# Patient Record
Sex: FEMALE | Race: BLACK OR AFRICAN AMERICAN | NOT HISPANIC OR LATINO
[De-identification: names, ages, dates, MRNs, and addresses within clinical notes are randomized per-mention and may not be internally consistent; named-entity substitution may affect disease eponyms.]

---

## 2018-04-06 ENCOUNTER — APPOINTMENT (OUTPATIENT)
Dept: OBGYN | Facility: CLINIC | Age: 58
End: 2018-04-06
Payer: COMMERCIAL

## 2018-04-06 VITALS
SYSTOLIC BLOOD PRESSURE: 156 MMHG | HEIGHT: 62 IN | DIASTOLIC BLOOD PRESSURE: 100 MMHG | HEART RATE: 91 BPM | WEIGHT: 138 LBS | BODY MASS INDEX: 25.4 KG/M2

## 2018-04-06 DIAGNOSIS — Z78.9 OTHER SPECIFIED HEALTH STATUS: ICD-10-CM

## 2018-04-06 DIAGNOSIS — I10 ESSENTIAL (PRIMARY) HYPERTENSION: ICD-10-CM

## 2018-04-06 PROCEDURE — 99396 PREV VISIT EST AGE 40-64: CPT

## 2018-04-08 LAB
C TRACH RRNA SPEC QL NAA+PROBE: NOT DETECTED
HPV HIGH+LOW RISK DNA PNL CVX: NOT DETECTED
N GONORRHOEA RRNA SPEC QL NAA+PROBE: NOT DETECTED
SOURCE AMPLIFICATION: NORMAL

## 2018-04-11 LAB — CYTOLOGY CVX/VAG DOC THIN PREP: NORMAL

## 2019-04-20 ENCOUNTER — EMERGENCY (EMERGENCY)
Facility: HOSPITAL | Age: 59
LOS: 1 days | Discharge: HOME | End: 2019-04-20
Attending: EMERGENCY MEDICINE
Payer: COMMERCIAL

## 2019-04-20 VITALS
HEART RATE: 97 BPM | WEIGHT: 134.92 LBS | RESPIRATION RATE: 18 BRPM | HEIGHT: 62 IN | DIASTOLIC BLOOD PRESSURE: 84 MMHG | TEMPERATURE: 98 F | OXYGEN SATURATION: 98 % | SYSTOLIC BLOOD PRESSURE: 189 MMHG

## 2019-04-20 VITALS — HEART RATE: 98 BPM | OXYGEN SATURATION: 97 % | RESPIRATION RATE: 18 BRPM

## 2019-04-20 PROCEDURE — 71046 X-RAY EXAM CHEST 2 VIEWS: CPT | Mod: 26

## 2019-04-20 PROCEDURE — 71046 X-RAY EXAM CHEST 2 VIEWS: CPT

## 2019-04-20 PROCEDURE — 94640 AIRWAY INHALATION TREATMENT: CPT

## 2019-04-20 PROCEDURE — 99283 EMERGENCY DEPT VISIT LOW MDM: CPT | Mod: 25

## 2019-04-20 PROCEDURE — 99284 EMERGENCY DEPT VISIT MOD MDM: CPT

## 2019-04-20 RX ORDER — ALBUTEROL 90 UG/1
2.5 AEROSOL, METERED ORAL ONCE
Qty: 0 | Refills: 0 | Status: COMPLETED | OUTPATIENT
Start: 2019-04-20 | End: 2019-04-20

## 2019-04-20 RX ORDER — PSEUDOEPHEDRINE HCL 30 MG
30 TABLET ORAL ONCE
Qty: 0 | Refills: 0 | Status: COMPLETED | OUTPATIENT
Start: 2019-04-20 | End: 2019-04-20

## 2019-04-20 RX ORDER — PHENYLEPHRINE HYDROCHLORIDE 10 MG/ML
1 INJECTION INTRAVENOUS
Qty: 28 | Refills: 0
Start: 2019-04-20 | End: 2019-05-03

## 2019-04-20 RX ORDER — AZELASTINE 137 UG/1
2 SPRAY, METERED NASAL
Qty: 1 | Refills: 1
Start: 2019-04-20 | End: 2019-05-17

## 2019-04-20 RX ORDER — FLUTICASONE PROPIONATE 50 MCG
2 SPRAY, SUSPENSION NASAL ONCE
Qty: 0 | Refills: 0 | Status: COMPLETED | OUTPATIENT
Start: 2019-04-20 | End: 2019-04-20

## 2019-04-20 RX ADMIN — Medication 30 MILLIGRAM(S): at 17:24

## 2019-04-20 RX ADMIN — Medication 2 SPRAY(S): at 17:24

## 2019-04-20 RX ADMIN — ALBUTEROL 2.5 MILLIGRAM(S): 90 AEROSOL, METERED ORAL at 17:25

## 2019-04-20 NOTE — ED STATDOCS - ATTENDING CONTRIBUTION TO CARE
I, Alexander Murphy, performed the initial face to face bedside interview with this patient regarding history of present illness, review of symptoms and relevant past medical, social and family history.  I completed an independent physical examination.  I was the initial provider who evaluated this patient. I have signed out the follow up of any pending tests (i.e. labs, radiological studies) to the ACP.  I have communicated the patient’s plan of care and disposition with the ACP.

## 2019-04-20 NOTE — ED STATDOCS - CHPI ED SYMPTOM POS
dyspnea, ear pain, throat pain/NASAL CONGESTION/COUGH dyspnea on exertion, ear pain, throat pain/COUGH/NASAL CONGESTION

## 2019-04-20 NOTE — ED ADULT TRIAGE NOTE - CHIEF COMPLAINT QUOTE
Patient arrived ambulatory to ED, awake alert, and oriented times 3, breathing unlabored.  Patient states cough, throat pain since March 29th.  Patient states decreased taste.  patient also states SOB on exertion since March 29th.  Patient finished prednisone yesterday.  Patient non compliant with HTN meds

## 2019-04-20 NOTE — ED ADULT NURSE NOTE - NSIMPLEMENTINTERV_GEN_ALL_ED
Implemented All Universal Safety Interventions:  [place] to call system. Call bell, personal items and telephone within reach. Instruct patient to call for assistance. Room bathroom lighting operational. Non-slip footwear when patient is off stretcher. Physically safe environment: no spills, clutter or unnecessary equipment. Stretcher in lowest position, wheels locked, appropriate side rails in place.

## 2019-04-20 NOTE — ED STATDOCS - OBJECTIVE STATEMENT
58 y/o F pt with HTN (non-compliant with medications) presents to ED c/o worsening cough that began 3 weeks ago. Notes blood in mucous and blood after blowing her nose. Associated sx of dyspnea, throat pain, nasal congestion, ear pain and decreased sense of taste. 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 Notes 45 minutes after exposure, she felt throat pain. She has also been taking Zyrtec and nasal spray, with minimal relief. Within the last 3 weeks, she also went to urgent care and her PMD, and was prescribed prednisone on both occasions, with minimal relief. Pt finished prednisone yesterday. Non smoker, no EtOH. Denies fever, chills, headache, chest pain, back pain, generalized body aches. No further acute complaints at this time. 60 y/o F pt with HTN (non-compliant with medications) presents to ED c/o worsening cough that began 3 weeks ago. Associated sx of dyspnea on exertion, throat pain, nasal congestion, ear pain and decreased sense of taste. When she coughs, there is a "burning sensation in her throat and neck." Notes there is blood in mucous after coughing, and blood after blowing her nose. Pt reports at work on March 29th, she was exposed to "something through the vents during construction." Notes 45 minutes after exposure, she felt throat pain. She has also been taking Zyrtec and nasal spray, with minimal relief. Within the last 3 weeks, she also went to urgent care and her PMD, and was prescribed prednisone on both occasions, with minimal relief. Pt finished prednisone yesterday. Non smoker, no EtOH. Denies fever, chills, headache, chest pain, back pain, generalized body aches. No further acute complaints at this time.

## 2019-04-20 NOTE — ED STATDOCS - PROGRESS NOTE DETAILS
ISAURA Munoz NOTE: Pt evaluated at bedside. Pt evaluated prior by intake physician. Otherwise HPI/PE/ROS as noted above. Will follow up plan per intake physician. ISAURA Munoz NOTE: Pt evaluated at bedside. Denies visual changes, CP, palpitations. Pt evaluated prior by intake physician. Otherwise HPI/PE/ROS as noted above. Will follow up plan per intake physician. ISAURA Muonz NOTE: Patient stable for discharge, reports improvement in symptoms, vital signs stable, tolerating PO, ambulatory in ED. D/w Dr. Murphy, will send Rx and have pt f/u with ENT or allergy.  Advised pt to follow up with PMD for BP and to take BP meds as prescribed. Discussion with pt includes but is not limited to: results, plan, proper medication use and side effects, and return precautions. Patient will follow up with their PMD in 1-2 days and any specialists discussed. Advised patient to seek immediate medical attention for any new/worsening symptoms or concerns.  Patient provided with ample opportunity to ask questions which were answered to the fullest extent. Patient verbalized understanding and agreement of plan.

## 2019-04-20 NOTE — ED ADULT NURSE NOTE - PRO INTERPRETER NEED 2
Patient placed on continuous cardiac monitor, automatic blood pressure cuff and continuous pulse oximeter.   English

## 2019-04-30 PROBLEM — I10 ESSENTIAL (PRIMARY) HYPERTENSION: Chronic | Status: ACTIVE | Noted: 2019-04-20

## 2019-06-03 ENCOUNTER — APPOINTMENT (OUTPATIENT)
Dept: OBGYN | Facility: CLINIC | Age: 59
End: 2019-06-03

## 2019-06-11 ENCOUNTER — EMERGENCY (EMERGENCY)
Facility: HOSPITAL | Age: 59
LOS: 1 days | Discharge: HOME | End: 2019-06-11
Attending: EMERGENCY MEDICINE
Payer: COMMERCIAL

## 2019-06-11 VITALS — HEART RATE: 93 BPM | OXYGEN SATURATION: 98 % | SYSTOLIC BLOOD PRESSURE: 152 MMHG | DIASTOLIC BLOOD PRESSURE: 104 MMHG

## 2019-06-11 VITALS
HEIGHT: 62 IN | HEART RATE: 110 BPM | WEIGHT: 134.92 LBS | TEMPERATURE: 98 F | RESPIRATION RATE: 18 BRPM | OXYGEN SATURATION: 100 % | DIASTOLIC BLOOD PRESSURE: 115 MMHG | SYSTOLIC BLOOD PRESSURE: 182 MMHG

## 2019-06-11 DIAGNOSIS — R06.00 DYSPNEA, UNSPECIFIED: ICD-10-CM

## 2019-06-11 DIAGNOSIS — Z98.890 OTHER SPECIFIED POSTPROCEDURAL STATES: Chronic | ICD-10-CM

## 2019-06-11 DIAGNOSIS — R07.89 OTHER CHEST PAIN: ICD-10-CM

## 2019-06-11 DIAGNOSIS — Z90.49 ACQUIRED ABSENCE OF OTHER SPECIFIED PARTS OF DIGESTIVE TRACT: Chronic | ICD-10-CM

## 2019-06-11 DIAGNOSIS — Z90.711 ACQUIRED ABSENCE OF UTERUS WITH REMAINING CERVICAL STUMP: Chronic | ICD-10-CM

## 2019-06-11 DIAGNOSIS — Z98.51 TUBAL LIGATION STATUS: Chronic | ICD-10-CM

## 2019-06-11 DIAGNOSIS — Z98.891 HISTORY OF UTERINE SCAR FROM PREVIOUS SURGERY: Chronic | ICD-10-CM

## 2019-06-11 LAB
ALBUMIN SERPL ELPH-MCNC: 4.3 G/DL — SIGNIFICANT CHANGE UP (ref 3.3–5.2)
ALP SERPL-CCNC: 77 U/L — SIGNIFICANT CHANGE UP (ref 40–120)
ALT FLD-CCNC: 54 U/L — HIGH
ANION GAP SERPL CALC-SCNC: 16 MMOL/L — SIGNIFICANT CHANGE UP (ref 5–17)
AST SERPL-CCNC: 46 U/L — HIGH
BILIRUB SERPL-MCNC: 0.3 MG/DL — LOW (ref 0.4–2)
BUN SERPL-MCNC: 15 MG/DL — SIGNIFICANT CHANGE UP (ref 8–20)
CALCIUM SERPL-MCNC: 10.4 MG/DL — HIGH (ref 8.6–10.2)
CHLORIDE SERPL-SCNC: 103 MMOL/L — SIGNIFICANT CHANGE UP (ref 98–107)
CO2 SERPL-SCNC: 23 MMOL/L — SIGNIFICANT CHANGE UP (ref 22–29)
CREAT SERPL-MCNC: 0.8 MG/DL — SIGNIFICANT CHANGE UP (ref 0.5–1.3)
D DIMER BLD IA.RAPID-MCNC: <150 NG/ML DDU — SIGNIFICANT CHANGE UP
GLUCOSE SERPL-MCNC: 103 MG/DL — SIGNIFICANT CHANGE UP (ref 70–115)
HCT VFR BLD CALC: 40.6 % — SIGNIFICANT CHANGE UP (ref 37–47)
HGB BLD-MCNC: 14.1 G/DL — SIGNIFICANT CHANGE UP (ref 12–16)
MCHC RBC-ENTMCNC: 29 PG — SIGNIFICANT CHANGE UP (ref 27–31)
MCHC RBC-ENTMCNC: 34.7 G/DL — SIGNIFICANT CHANGE UP (ref 32–36)
MCV RBC AUTO: 83.5 FL — SIGNIFICANT CHANGE UP (ref 81–99)
NT-PROBNP SERPL-SCNC: 23 PG/ML — SIGNIFICANT CHANGE UP (ref 0–300)
PLATELET # BLD AUTO: 257 K/UL — SIGNIFICANT CHANGE UP (ref 150–400)
POTASSIUM SERPL-MCNC: 3.9 MMOL/L — SIGNIFICANT CHANGE UP (ref 3.5–5.3)
POTASSIUM SERPL-SCNC: 3.9 MMOL/L — SIGNIFICANT CHANGE UP (ref 3.5–5.3)
PROT SERPL-MCNC: 7.8 G/DL — SIGNIFICANT CHANGE UP (ref 6.6–8.7)
RBC # BLD: 4.86 M/UL — SIGNIFICANT CHANGE UP (ref 4.4–5.2)
RBC # FLD: 13.3 % — SIGNIFICANT CHANGE UP (ref 11–15.6)
SODIUM SERPL-SCNC: 142 MMOL/L — SIGNIFICANT CHANGE UP (ref 135–145)
TROPONIN T SERPL-MCNC: <0.01 NG/ML — SIGNIFICANT CHANGE UP (ref 0–0.06)
TROPONIN T SERPL-MCNC: <0.01 NG/ML — SIGNIFICANT CHANGE UP (ref 0–0.06)
WBC # BLD: 8.8 K/UL — SIGNIFICANT CHANGE UP (ref 4.8–10.8)
WBC # FLD AUTO: 8.8 K/UL — SIGNIFICANT CHANGE UP (ref 4.8–10.8)

## 2019-06-11 PROCEDURE — 93017 CV STRESS TEST TRACING ONLY: CPT

## 2019-06-11 PROCEDURE — 80053 COMPREHEN METABOLIC PANEL: CPT

## 2019-06-11 PROCEDURE — 93306 TTE W/DOPPLER COMPLETE: CPT | Mod: 26

## 2019-06-11 PROCEDURE — 93016 CV STRESS TEST SUPVJ ONLY: CPT

## 2019-06-11 PROCEDURE — 99220: CPT

## 2019-06-11 PROCEDURE — 85027 COMPLETE CBC AUTOMATED: CPT

## 2019-06-11 PROCEDURE — A9500: CPT

## 2019-06-11 PROCEDURE — 71045 X-RAY EXAM CHEST 1 VIEW: CPT

## 2019-06-11 PROCEDURE — 78452 HT MUSCLE IMAGE SPECT MULT: CPT

## 2019-06-11 PROCEDURE — 36415 COLL VENOUS BLD VENIPUNCTURE: CPT

## 2019-06-11 PROCEDURE — 78452 HT MUSCLE IMAGE SPECT MULT: CPT | Mod: 26

## 2019-06-11 PROCEDURE — 93306 TTE W/DOPPLER COMPLETE: CPT

## 2019-06-11 PROCEDURE — G0378: CPT

## 2019-06-11 PROCEDURE — 93005 ELECTROCARDIOGRAM TRACING: CPT

## 2019-06-11 PROCEDURE — 99222 1ST HOSP IP/OBS MODERATE 55: CPT | Mod: 25

## 2019-06-11 PROCEDURE — 83880 ASSAY OF NATRIURETIC PEPTIDE: CPT

## 2019-06-11 PROCEDURE — 85379 FIBRIN DEGRADATION QUANT: CPT

## 2019-06-11 PROCEDURE — 93010 ELECTROCARDIOGRAM REPORT: CPT

## 2019-06-11 PROCEDURE — 84484 ASSAY OF TROPONIN QUANT: CPT

## 2019-06-11 PROCEDURE — 82550 ASSAY OF CK (CPK): CPT

## 2019-06-11 PROCEDURE — 71045 X-RAY EXAM CHEST 1 VIEW: CPT | Mod: 26

## 2019-06-11 PROCEDURE — 93018 CV STRESS TEST I&R ONLY: CPT

## 2019-06-11 PROCEDURE — 99284 EMERGENCY DEPT VISIT MOD MDM: CPT | Mod: 25

## 2019-06-11 RX ORDER — LOSARTAN POTASSIUM 100 MG/1
100 TABLET, FILM COATED ORAL ONCE
Refills: 0 | Status: COMPLETED | OUTPATIENT
Start: 2019-06-11 | End: 2019-06-11

## 2019-06-11 RX ORDER — LABETALOL HCL 100 MG
10 TABLET ORAL ONCE
Refills: 0 | Status: COMPLETED | OUTPATIENT
Start: 2019-06-11 | End: 2019-06-11

## 2019-06-11 RX ORDER — LOSARTAN POTASSIUM 100 MG/1
0 TABLET, FILM COATED ORAL
Qty: 0 | Refills: 0 | DISCHARGE

## 2019-06-11 RX ORDER — SODIUM CHLORIDE 9 MG/ML
3 INJECTION INTRAMUSCULAR; INTRAVENOUS; SUBCUTANEOUS ONCE
Refills: 0 | Status: COMPLETED | OUTPATIENT
Start: 2019-06-11 | End: 2019-06-11

## 2019-06-11 RX ORDER — FLUTICASONE PROPIONATE 50 MCG
1 SPRAY, SUSPENSION NASAL
Refills: 0 | Status: DISCONTINUED | OUTPATIENT
Start: 2019-06-11 | End: 2019-06-16

## 2019-06-11 RX ORDER — AMLODIPINE BESYLATE 2.5 MG/1
5 TABLET ORAL ONCE
Refills: 0 | Status: COMPLETED | OUTPATIENT
Start: 2019-06-11 | End: 2019-06-11

## 2019-06-11 RX ORDER — LOSARTAN POTASSIUM 100 MG/1
100 TABLET, FILM COATED ORAL DAILY
Refills: 0 | Status: DISCONTINUED | OUTPATIENT
Start: 2019-06-12 | End: 2019-06-16

## 2019-06-11 RX ADMIN — LOSARTAN POTASSIUM 100 MILLIGRAM(S): 100 TABLET, FILM COATED ORAL at 09:57

## 2019-06-11 RX ADMIN — AMLODIPINE BESYLATE 5 MILLIGRAM(S): 2.5 TABLET ORAL at 17:10

## 2019-06-11 RX ADMIN — SODIUM CHLORIDE 3 MILLILITER(S): 9 INJECTION INTRAMUSCULAR; INTRAVENOUS; SUBCUTANEOUS at 09:55

## 2019-06-11 NOTE — CONSULT NOTE ADULT - PROBLEM SELECTOR RECOMMENDATION 9
- EKG, no ischemic changes  - Troponin neg  - serial troponin  - TTE  - Nuclear EST  - if TTE and Nuclear EST negative patient may f/u outpatient cardiology in 2 weeks

## 2019-06-11 NOTE — ED STATDOCS - CLINICAL SUMMARY MEDICAL DECISION MAKING FREE TEXT BOX
pt with diaphoresis followed by SOB at work, recurrent SOB unknown work related being worked up , hypertensive,  tachycardiac, exam and ekg normal  Focused evaluation performed at intake to enter orders appropriate for patient's complaint.  Patient placed in the main ED to complete full evaluation by another provider.

## 2019-06-11 NOTE — ED CDU PROVIDER INITIAL DAY NOTE - OBJECTIVE STATEMENT
60 y/o F with HTN presenting with diaphoresis and chest tightness since 3:30am this morning while sleeping. 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 Pt states that lately whenever she goes to work, she starts coughing up mucous, wheezing. Last late at 3 AM pt was at home and states she felt sore throat/ burning,  and was profusely sweating, R sided chest discomfort when she was breathing, and felt SOB.  Pt states she has been seeing an allergist for these symptoms and was pending appointment with a pulmonologist who she has not yet seen.  Pt denies any cardiac history or FH of cardiac problems; pt does not see a cardiologist. Pt states she has been using an albuterol nebulizer every day after work.   Pt states she has been at her work for 4 years. Pt on losartan for HTN. Denies family hx of early cardiac disease. Never smoker.

## 2019-06-11 NOTE — ED ADULT NURSE NOTE - ED STAT RN HANDOFF DETAILS
Report given to You MONK in OBS unit.  Pt still in main radiology CAT scan.  Pt will return to CDU area.

## 2019-06-11 NOTE — ED CDU PROVIDER INITIAL DAY NOTE - PSH
H/O  section    H/O right inguinal hernia repair    H/O tubal ligation    History of appendectomy    History of D&C    History of partial hysterectomy

## 2019-06-11 NOTE — ED CDU PROVIDER DISPOSITION NOTE - CLINICAL COURSE
60 y/o F with SOB, left CP, and diaphoresis, with continued allergies/sinus problems for several months, seen and cleared by cardiology, had unremarkable trop, TTE and stress test.

## 2019-06-11 NOTE — ED ADULT TRIAGE NOTE - CHIEF COMPLAINT QUOTE
"I have been sweating since 3:30 this morning and I feel dizzy and lightheaded. 'I feel SOB and feel tightness in my chest" Pt states she was recently diagnosed with asthma. Pt SOB in triage and very hypertensive.  Did not take meds this am.

## 2019-06-11 NOTE — ED STATDOCS - PROGRESS NOTE DETAILS
58 y/o F pt with hx of HTN (losartan 100mg), partial hysterectomy presents to ED c/o diaphoresis and dizziness starting at 3:30am. Pt admits to having chest pressure,  SOB, productive cough, sore throat especially after going to work.  Pt believes exposure to allergan at work; seen allergies doctor no conclusive test. She works at insurance company. Scheduled appointment with pulmonologist next week.  She didn't take her blood pressure medication this morning. Nonsmoker. No aspirin,   PMD: Dr. Hanson    PE: Tachycardiac, lungs clear. Rectal Temp: 98

## 2019-06-11 NOTE — CONSULT NOTE ADULT - ATTENDING COMMENTS
Patient seen and examined by me.  I have discussed my recommendation with the PA which are outlined above.  Will follow.

## 2019-06-11 NOTE — ED CDU PROVIDER DISPOSITION NOTE - CARE PROVIDER_API CALL
Saran Caba)  Otolaryngology  [address] [place] [address]  Phone: [phone]  Fax: [phone]  Follow Up Time:     Konstantin Yoder)  Cardiology; Cardiovascular Disease; Internal Medicine  [address] [place] [address]  Phone: [phone]  Fax: [phone]  Follow Up Time:

## 2019-06-11 NOTE — CONSULT NOTE ADULT - ASSESSMENT
A/P:  58yo AA with PMH HTN, newly diagnosed asthma presents to ED c/o chest tightness, productive cough and wheeze since 3/29.  Patient states that she has had these symptoms since 3/29 and attributes them to work, as she only has symptoms when she goes there.  Though recently she now has increasing SOB/HALL at home, and for the past several weeks intermittent nausea and decreased PO intake.  Symptoms resolve with use of inhaler. At 0300 she woke up diaphoretic with diffuse chest tightness and throat burning, she fell back to sleep and woke up at 0700 with symptoms still present so she came to the ED.

## 2019-06-11 NOTE — ED CDU PROVIDER DISPOSITION NOTE - ATTENDING CONTRIBUTION TO CARE
I, Ignacio Baez, performed a face to face bedside interview with this patient regarding history of present illness, review of symptoms and relevant past medical, social and family history.  I completed an independent physical examination. I have communicated the patient’s plan of care and disposition with the ACP.  59 year old presented with left sided chest pain, ekg non-ischmic, trop neg, stress neg, stable for dc

## 2019-06-11 NOTE — CONSULT NOTE ADULT - SUBJECTIVE AND OBJECTIVE BOX
[facility]                                                        Office: [address] [address]                                                       Telephone: [phone]. Fax:[phone]                                                              CARDIOLOGY CONSULTATION NOTE                                                                                             Consult requested by:  Dr. Gallardo    PCP: Dr. Snow    Primary Cardiologist. Denies    Reason for Consultation: Chest pain    History obtained by: Patient and medical record     obtained: No    Chief complaint:    Patient is a 59y old  Female who presents with a chief complaint of chest tightness, cough, wheeze    HPI:  58yo AA with PMH HTN, newly diagnosed asthma presents to ED c/o chest tightness, productive cough and wheeze since 3/29.  Patient states that she has had these symptoms since 3/29 and attributes them to work, as she only has symptoms when she goes there.  Though recently she now has increasing SOB/HALL at home, and for the past several weeks intermittent nausea and decreased PO intake.  Symptoms resolve with use of inhaler. At 0300 she woke up diaphoretic with diffuse chest tightness and throat burning, she fell back to sleep and woke up at 0700 with symptoms still present so she came to the ED.  Denies palpitations, irregular and/or rapid heart beat, syncope/near syncope, dizziness, orthopnea, PND, edema, v/d, hematuria, or hematochezia.       ALLERGIES: Allergies    No Known Allergies    Intolerances          CURRENT MEDICATIONS:  MEDICATIONS  (STANDING):  labetalol Injectable 10 milliGRAM(s) IV Push once      HOME MEDICATIONS:  Home Medications:  losartan:  (2019 10:23)    PAST MEDICAL HISTORY  Asthma  HTN (hypertension)      PAST SURGICAL HISTORY   x2  tubal ligation  D&C  Appendectomy  hernia      FAMILY HISTORY:   Mom and Dad- Emphysema      SOCIAL HISTORY:     CIGARETTES:   denies    ALCOHOL: denies    DRUG ABUSE: denies      REVIEW OF SYSTEMS:  CONSTITUTIONAL:  as per HPI  HEENT:  Eyes:  No diplopia or blurred vision. ENT:  No earache, sore throat or runny nose.  CARDIOVASCULAR:  as per HPI  RESPIRATORY:  as per HPI  GASTROINTESTINAL:  No nausea, vomiting or diarrhea.  GENITOURINARY:  No dysuria, frequency or urgency. No Blood in urine  MUSCULOSKELETAL:  no joint aches, no muscle pain  SKIN:  No change in skin, hair or nails.  NEUROLOGIC:  No paresthesias, fasciculations, seizures or weakness.  PSYCHIATRIC:  No disorder of thought or mood.  ENDOCRINE:  No heat or cold intolerance, polyuria or polydipsia.  HEMATOLOGICAL:  No easy bruising or bleeding.         Vital Signs Last 24 Hrs  T(C): 37.1 (19 @ 08:34), Max: 37.1 (19 @ 08:34)  T(F): 98.7 (19 @ 08:34), Max: 98.7 (19 @ 08:34)  HR: 95 (19 @ 11:06) (87 - 110)  BP: 147/84 (19 @ 11:06) (147/84 - 182/115)  BP(mean): --  RR: 18 (19 @ 11:06) (16 - 18)  SpO2: 100% (19 @ 11:06) (100% - 100%)    PHYSICAL EXAM:  Constitutional: Comfortable . No acute distress.   HEENT: Atraumatic and normocephalic , neck is supple . no JVD. Unremarkable  CNS: Alert and awake, Grossly nonfocal.  Lymph Nodes: Cervical : Not palpable.  Respiratory: Bilateral clear breath sounds.  Cardiovascular: Normal S1S2. . No murmur/rubs or gallop.  Gastrointestinal: Soft non-tender and non distended . +Bowel sounds.   Extremities: No leg edema.   Psychiatric: Calm . no agitation.  Skin: No skin rash.    Intake and output:     LABS:                        14.1   8.8   )-----------( 257      ( 2019 10:18 )             40.6         142  |  103  |  15.0  ----------------------------<  103  3.9   |  23.0  |  0.80    Ca    10.4<H>      2019 10:18    TPro  7.8  /  Alb  4.3  /  TBili  0.3<L>  /  DBili  x   /  AST  46<H>  /  ALT  54<H>  /  AlkPhos  77      CARDIAC MARKERS ( 2019 10:18 )  x     / <0.01 ng/mL / x     / x     / x        ;p-BNP=Serum Pro-Brain Natriuretic Peptide: 23 pg/mL ( @ 10:18)        LIVER FUNCTIONS - ( 2019 10:18 )  Alb: 4.3 g/dL / Pro: 7.8 g/dL / ALK PHOS: 77 U/L / ALT: 54 U/L / AST: 46 U/L / GGT: x           INTERPRETATION OF TELEMETRY: Sinus rhythm 70s.   ECG: Sinus tachycardia at 103bpm.     RADIOLOGY & ADDITIONAL STUDIES/ECHO/CARDIAC CATH:   < from: Xray Chest 1 View-PORTABLE IMMEDIATE (19 @ 10:58) >     EXAM:  XR CHEST PORTABLE IMMED 1V                          PROCEDURE DATE:  2019          INTERPRETATION:  CLINICAL INFORMATION: SOB. . .     EXAM: AP chest.    COMPARISON: 2019    FINDINGS:  No focal consolidation.  No pleural effusions or pneumothorax.  The cardiomediastinal silhouette is within normal limits.      IMPRESSION: No focal consolidation.    < end of copied text >

## 2019-06-11 NOTE — ED ADULT NURSE NOTE - OBJECTIVE STATEMENT
Pt A&OX3, amb ad joseph, c/o sob, diaphoresis, and episodes chest tightness.  NSR on monitor.  Pt is hypertensive, medicated as ordered.  NO chest pain at this time.

## 2019-06-11 NOTE — ED CDU PROVIDER INITIAL DAY NOTE - PROGRESS NOTE DETAILS
ISAURA Munoz NOTE: Reviewed all results with MD Baez, pt to be given follow up for ENT, Cardiology, and pulmonology. Pt has albuterol inhaler and flonase at home. Patient stable for discharge, vital signs stable, tolerating PO, ambulatory in ED.  Discussion with pt includes but is not limited to: results, plan, proper medication use and side effects, and return precautions. Patient will follow up with their PMD in 1-2 days and any specialists discussed. Advised patient to seek immediate medical attention for any new/worsening symptoms or concerns.  Patient provided with ample opportunity to ask questions which were answered to the fullest extent.  Patient given printed copies of lab/radiology results to aid in proper outpatient follow up. Patient verbalized understanding and agreement of plan.

## 2019-06-11 NOTE — ED CDU PROVIDER INITIAL DAY NOTE - PHYSICAL EXAMINATION
Vital signs noted, see flowsheet.  General: Well nourished/developed. In no acute distress, well appearing and non-toxic.  HEENT: Normocephalic/atraumatic.  Moist mucous membranes. No nasal discharge, throat clear.  Conjunctiva and sclera clear b/l.  EOMI. PERRL.  Neck: Soft and supple, full ROM without pain.  Cardiac: Regular rate and rhythm. +S1/S2. Peripheral pulses 2+ and symmetric b/l. No LE edema.  Respiratory: Speaking in full sentences, no evidence of respiratory distress. Lungs clear to ascultation b/l, no wheezes/rhonchi/rales/stridor.   Abdomen: Normoactive bowel sounds x 4 quadrants. Soft, non-tender, non-distended. No guarding or rebound tenderness. No suprapubic tenderness.  Back: Spine midline and non-tender. No CVAT.  Skin: Normal color for race, no evidence of rash, ecchymosis, cyanosis or jaundice.   Neuro: Awake, alert and oriented to person/place/time/situation. Moves all extremities spontaneously and symmetrically.  No focal deficits or facial droop.  CN II-XII intact.

## 2019-06-11 NOTE — ED CDU PROVIDER INITIAL DAY NOTE - MEDICAL DECISION MAKING DETAILS
58 y/o F with SOB, diaphoresis and chest pressure  -consult cardiology, CTA cardiac, trend trops  -Will follow up and re-evaluate patient

## 2019-06-11 NOTE — ED PROVIDER NOTE - OBJECTIVE STATEMENT
59F with HTN presenting with SOB since March 29, sinus issues; intermittent nausea;  decreased PO intake. Pt states that lately whenever she goes to work, she starts coughing up mucous, wheezing. Last late at 3 AM pt was at home and states she felt sore throat/ burning,  and was profusely sweating, R sided chest discomfort when she was breathing, and felt SOB.  Pt states she has been seeing an allergist for these symptoms and was pending appointment with a pulmonologist who she has not yet seen .  Pt denies any cardiac history or FH of cardiac problems; pt does not see a cardiologist.    Pt on losartan for HTN. 59F with HTN presenting with SOB since March 29, sinus issues; intermittent nausea;  decreased PO intake. Pt states that lately whenever she goes to work, she starts coughing up mucous, wheezing. Last late at 3 AM pt was at home and states she felt sore throat/ burning,  and was profusely sweating, R sided chest discomfort when she was breathing, and felt SOB.  Pt states she has been seeing an allergist for these symptoms and was pending appointment with a pulmonologist who she has not yet seen .  Pt denies any cardiac history or FH of cardiac problems; pt does not see a cardiologist. Pt states she has been using an albuterol nebulizer every day after work.   Pt states she has been at her work for 4 years.     Pt on losartan for HTN.

## 2019-06-11 NOTE — ED STATDOCS - NS_ ATTENDINGSCRIBEDETAILS _ED_A_ED_FT
pt with cardiac symptoms. main room eval.    The history, relevant review of systems, past medical and surgical history, medical decision making, and physical examination was documented by the scribe in my presence and I attest to the accuracy of the documentation.

## 2019-06-11 NOTE — ED CDU PROVIDER INITIAL DAY NOTE - ATTENDING CONTRIBUTION TO CARE
I, Ignacio Baez, performed a face to face bedside interview with this patient regarding history of present illness, review of symptoms and relevant past medical, social and family history.  I completed an independent physical examination. I have communicated the patient’s plan of care and disposition with the ACP.  59 year old female presents with chest pressure, currentl chest pain free, trop neg, placed on Obs for chest pain eval  Gen: NAD, well appearing  CV: RRR  Pul: CTA b/l  Abd: Soft, non-distended, non-tender  Neuro: no focal deficits

## 2019-09-14 ENCOUNTER — EMERGENCY (EMERGENCY)
Facility: HOSPITAL | Age: 59
LOS: 1 days | Discharge: HOME | End: 2019-09-14
Attending: EMERGENCY MEDICINE
Payer: COMMERCIAL

## 2019-09-14 VITALS
DIASTOLIC BLOOD PRESSURE: 97 MMHG | RESPIRATION RATE: 16 BRPM | TEMPERATURE: 98 F | SYSTOLIC BLOOD PRESSURE: 141 MMHG | OXYGEN SATURATION: 98 % | HEART RATE: 75 BPM

## 2019-09-14 VITALS
TEMPERATURE: 98 F | HEIGHT: 62 IN | OXYGEN SATURATION: 99 % | RESPIRATION RATE: 20 BRPM | SYSTOLIC BLOOD PRESSURE: 185 MMHG | DIASTOLIC BLOOD PRESSURE: 102 MMHG | WEIGHT: 143.96 LBS | HEART RATE: 100 BPM

## 2019-09-14 DIAGNOSIS — Z98.890 OTHER SPECIFIED POSTPROCEDURAL STATES: Chronic | ICD-10-CM

## 2019-09-14 DIAGNOSIS — Z90.49 ACQUIRED ABSENCE OF OTHER SPECIFIED PARTS OF DIGESTIVE TRACT: Chronic | ICD-10-CM

## 2019-09-14 DIAGNOSIS — Z98.51 TUBAL LIGATION STATUS: Chronic | ICD-10-CM

## 2019-09-14 DIAGNOSIS — Z90.711 ACQUIRED ABSENCE OF UTERUS WITH REMAINING CERVICAL STUMP: Chronic | ICD-10-CM

## 2019-09-14 DIAGNOSIS — Z98.891 HISTORY OF UTERINE SCAR FROM PREVIOUS SURGERY: Chronic | ICD-10-CM

## 2019-09-14 PROBLEM — J45.909 UNSPECIFIED ASTHMA, UNCOMPLICATED: Chronic | Status: ACTIVE | Noted: 2019-06-11

## 2019-09-14 LAB
APPEARANCE UR: CLEAR — SIGNIFICANT CHANGE UP
BACTERIA # UR AUTO: ABNORMAL
BILIRUB UR-MCNC: NEGATIVE — SIGNIFICANT CHANGE UP
COLOR SPEC: YELLOW — SIGNIFICANT CHANGE UP
DIFF PNL FLD: ABNORMAL
EPI CELLS # UR: SIGNIFICANT CHANGE UP
GLUCOSE UR QL: NEGATIVE MG/DL — SIGNIFICANT CHANGE UP
HYALINE CASTS # UR AUTO: ABNORMAL /LPF
KETONES UR-MCNC: ABNORMAL
LEUKOCYTE ESTERASE UR-ACNC: NEGATIVE — SIGNIFICANT CHANGE UP
NITRITE UR-MCNC: NEGATIVE — SIGNIFICANT CHANGE UP
PH UR: 6 — SIGNIFICANT CHANGE UP (ref 5–8)
PROT UR-MCNC: 15 MG/DL
RBC CASTS # UR COMP ASSIST: SIGNIFICANT CHANGE UP /HPF (ref 0–4)
SP GR SPEC: 1.02 — SIGNIFICANT CHANGE UP (ref 1.01–1.02)
UROBILINOGEN FLD QL: NEGATIVE MG/DL — SIGNIFICANT CHANGE UP
WBC UR QL: SIGNIFICANT CHANGE UP

## 2019-09-14 PROCEDURE — 73030 X-RAY EXAM OF SHOULDER: CPT | Mod: 26,LT

## 2019-09-14 PROCEDURE — 70450 CT HEAD/BRAIN W/O DYE: CPT

## 2019-09-14 PROCEDURE — 70450 CT HEAD/BRAIN W/O DYE: CPT | Mod: 26

## 2019-09-14 PROCEDURE — 96374 THER/PROPH/DIAG INJ IV PUSH: CPT

## 2019-09-14 PROCEDURE — 72125 CT NECK SPINE W/O DYE: CPT | Mod: 26

## 2019-09-14 PROCEDURE — 99284 EMERGENCY DEPT VISIT MOD MDM: CPT | Mod: 25

## 2019-09-14 PROCEDURE — 81001 URINALYSIS AUTO W/SCOPE: CPT

## 2019-09-14 PROCEDURE — 73030 X-RAY EXAM OF SHOULDER: CPT

## 2019-09-14 PROCEDURE — 72125 CT NECK SPINE W/O DYE: CPT

## 2019-09-14 PROCEDURE — 99284 EMERGENCY DEPT VISIT MOD MDM: CPT

## 2019-09-14 RX ORDER — IBUPROFEN 200 MG
1 TABLET ORAL
Qty: 30 | Refills: 0
Start: 2019-09-14 | End: 2019-09-23

## 2019-09-14 RX ORDER — METOCLOPRAMIDE HCL 10 MG
10 TABLET ORAL ONCE
Refills: 0 | Status: COMPLETED | OUTPATIENT
Start: 2019-09-14 | End: 2019-09-14

## 2019-09-14 RX ORDER — METHOCARBAMOL 500 MG/1
750 TABLET, FILM COATED ORAL ONCE
Refills: 0 | Status: COMPLETED | OUTPATIENT
Start: 2019-09-14 | End: 2019-09-14

## 2019-09-14 RX ADMIN — METHOCARBAMOL 750 MILLIGRAM(S): 500 TABLET, FILM COATED ORAL at 17:53

## 2019-09-14 RX ADMIN — Medication 10 MILLIGRAM(S): at 17:53

## 2019-09-14 NOTE — ED ADULT NURSE NOTE - INTERVENTIONS DEFINITIONS
Call bell, personal items and telephone within reach/Monitor gait and stability/[place] to call system/Instruct patient to call for assistance

## 2019-09-14 NOTE — ED STATDOCS - PATIENT PORTAL LINK FT
You can access the FollowMyHealth Patient Portal offered by [facility] by registering at the following website: [email or web address]. By joining [facility]’s FollowMyHealth portal, you will also be able to view your health information using other applications (apps) compatible with our system.

## 2019-09-14 NOTE — ED STATDOCS - PHYSICAL EXAMINATION
Mild midline and L cervical tenderness; however neck supple; no point bony tenderness.     CN II-XII intact; central and peripheral vision intact; normal finger-nose/ heel-shin; 5/5 strength to b/l UE, LE; no pronator drift to b/l UE, LE. Intact and equal sensation to face, extremities; Normal gait; no ataxia;

## 2019-09-14 NOTE — ED STATDOCS - PROGRESS NOTE DETAILS
Pt moved form intake Room. Pt seen and evaluated by intake Physician. HPI, Physical examination performed by intake Physician . Note reviewed and followup examination performed by me consistent with initial assessment. Agrees with intake Physician plan and tests. CT result pending. Pt signed out to ISAURA Burt CT result pending. Pt signed out to ISAURA Burt  ct results stable   adcvised on fu with spine for degenerative changes in c-spine

## 2019-09-14 NOTE — ED ADULT TRIAGE NOTE - BMI (KG/M2)
Addendum  created 07/17/17 1450 by Jl Santana MD    Anesthesia Event edited, Anesthesia Intra Flowsheets edited, Anesthesia Intra LDAs edited, Anesthesia Intra Meds edited, Anesthesia Staff edited, Delete clinical note, LDA properties accepted, Order sets accessed, Patient device added, Patient device removed, Sign clinical note       26.3

## 2019-09-14 NOTE — ED STATDOCS - OBJECTIVE STATEMENT
59yoF with HTN, p/w headache ; reports a slip and fall in the parking lot on 8/28; [place] [facility] b/l wrists, L knee; they called her to return the next day to receive 2 finger splints and a L wrist splint. States the following day, the back and neck and b/l shoulders started hurting and she started having constant headaches. Had been having L sided neck and shoulder pain.  Pt does not remember hitting her head;  states she had slipped and gone down but doesn't remember exactly how she fell;  Headache worsens when she lays down; states she has taken excedrin, pain pills , muscle relaxers;  medications don't help; notes compliance w/ her losartan;  headaches are mostly on the top of the head and sometimes to the R back.  Notes headache has not resolved.  States the light bothers her at her job (but states multiple ppl were complaining).  Pt denies any visual changes/ numbness/tingling/dizziness/ weakness. Pt states she tried to se an orthopedist but they declined to see her citing workers comp. Pt states she also tried to get workers comp to cover her sinus issues which she attributes to her wrok , but state sthat was declined as well.

## 2019-09-14 NOTE — ED STATDOCS - CLINICAL SUMMARY MEDICAL DECISION MAKING FREE TEXT BOX
Pt presents with ongoing headache s/p fall 2 weeks ago with unknown head trauma. Also L sided neck and L shoulder pain ;  Normal neurologic exam; pt able to normally wt bear including tandem gait;  Plan to check CTh/ c spine, xray shoulder, control pain and reevaluate.

## 2019-09-14 NOTE — ED ADULT NURSE NOTE - OBJECTIVE STATEMENT
Pt awake, alert and oriented x3 c/o neck pain and headache s/p slip and fall two weeks ago.  Respirations even and unlabored, denies chest pain.  Abdomen soft, nontender, nondistended.  Skin warm and dry.  Moving all extremities well and with purpose.

## 2019-09-14 NOTE — ED ADULT TRIAGE NOTE - CHIEF COMPLAINT QUOTE
c/o headache, fell on 9/28 hit her head was seen at urgent care for the fall , BUT HAS HEAD PAIN SINCE

## 2019-12-19 DIAGNOSIS — Z12.39 ENCOUNTER FOR OTHER SCREENING FOR MALIGNANT NEOPLASM OF BREAST: ICD-10-CM

## 2020-01-01 NOTE — ED CDU PROVIDER INITIAL DAY NOTE - CONSTITUTIONAL NEGATIVE STATEMENT, MLM
NICU DAILY PROGRESS NOTE   Patient: Jony Arce   \"Genie\"      MRN: [id number]                          : 2018, Gestational Age: 34w3d  Admit Date: 2020  Birthweight:   2320g  Admit Wt: Weight: (!) 2320 g (20)                                                                                                                  Today's Date: 2020  Current DOL: 11 days    CGA: 36w 0d                    Problem List:  Principal Problem:    Di-Di Twin del by c/s w/liveborn mate, 2,000-2,499 g, 33-34 completed weeks  Active Problems:    NG (nasogastric) tube fed     Gastroesophageal reflux in   Resolved Problems:    Respiratory distress syndrome in     Hyperbilirubinemia of prematurity       Interval Events: Stable in room air. On full enteral feeds with minimal cues to PO feed. Continues to have several Bradycardia with desat events each day associated with feeding/reflux. Trialed Caffeine load on  and no change in Bradys.  once, offered bottle and took only small amount. Gaining weight.     A/B/D Events: 8 Brief Bradycardias HR evelyn 60 only 11-15 sec, desat 78-87 all self limiting.  Associated with feedings and/or reflux.     Physical Exam     Today's Weight Birthweight   (!) 2336 g (200) Weight: (!) 2320 g (20)     Weight change over the past 24 hours: Weight change: 36 g   Weight change since birth: Birth weight not on file     Patient Vitals for the past 48 hrs:   Weight   20 0200 (!) 2300 g   20 0230 (!) 2336 g        Vitals 24 Hour Range   Temperature   Temp  Min: 98.1 °F (36.7 °C)  Max: 98.6 °F (37 °C)   Pulse   Pulse  Min: 138  Max: 160   Respiratory   Resp  Min: 37  Max: 51   Blood Pressure   BP  Min: 67/31  Max: 90/39   Pulse Oximetry    SpO2  Min: 94 %  Max: 100 %     Gen: Awake, fussing, and active. Responsive on exam.  Dressed and bundled in open crib.   HEENT: AFOF and soft, sutures approximated.   NG  tube in place.  Lungs:  Breath sounds clear and equal bilaterally with good aeration. Easy effort, no retractions.   Heart:  HR regular, no murmur. Cap refill <3 seconds; pulses 2+/=  Abdomen:  Soft and full, non-distended, non-tender. Active bowel sounds.  : Normal female genitalia  MS/Neuro: Moves all extremities equally. Tone appropriate for gestational age.  Active with good tone, + flexion.    Skin: Slightly jaundiced pink. Intact, no rashes or birthmarks.     Intake/Output     INTAKE:   TF goal: 150 mL/kg/day  Enteral: EBM 22 kCal/oz with Neosure powder or Neosure 22 kCal/oz  Breast fed: x 1  PO: 3 %    Total Intake: 353 mL/day = 151 mL/kg/day = 111 kCal/kg/day    OUTPUT:  Urine Output: x 8  Stools: x 3  Emesis: x 1     Medications     Meds:   • hepatitis B  0.5 mL Intramuscular Once       • glycerin 99.5% 0.375 g  liquid  0.3 mL Rectal Daily PRN For total dose see MAR         Labs/Imaging   Labs:  No results found for this or any previous visit (from the past 24 hour(s)).    Imaging:  None in the last 24 hours; see Epic for previous reports.     Respiratory Support     Room air     Assessment and Plan    Assessment:  Jony Arce is a Gestational Age: 34w3d AGA female now 11 days and 36w 0d cga requiring NICU admission for respiratory distress and prematurity. Admitted on CPAP, weaned to HFNC  and to room air . Has alarms, seem reflux related. Attempted 24 calorie/ounce feedings but had increased loose stools so returned to 22 calorie/ounce and stools have improved.  Not interested in PO yet, Mom puts to breast once a day. Gaining weight well past 4 days, over BW today.    Plan:  1) Resp: Room air   Continue CR monitor and pulse oximetry.     Continue to monitor alarms, many reflux related.    2) CV: Hemodynamically appropriate without murmur.  Blood pressures normal for gestational age.    3) FEN/GI: Tolerating full feedings at 150 mL/kg/day PO/NG.  Give slowly on pump over  45-60 min due to reflux.   Continue EBM fortified with Neosure to 22cal. Did not tolerate 24 gracie with increase in stooling.   Work on PO feeding per cues.   Mom doing skin to skin care and putting infant to breast once a day.   Follow daily weights - gaining weight and almost back to BW.    4) Heme: CBC normal with Hct 43 on .    Mom blood type B+, baby not typed.   Bilirubin down spontaneously, never required phototherapy.      5) ID: Delivered C/S for maternal indications, pre-eclampsia. No blood culture sent.  CBC normal x 2.    CBC and CRP sent  due to increase in Bradys - they are both normal. CRP < 0.3, no left shift.   No need to send blood culture or start antibiotics - Bradys appear to be r/t Reflux vs apnea of prematurity.   Antibiotics: None  during this hospitalization.   No current concern for infection - continue to monitor for signs/symptoms.    5) Neuro: Neurologically appropriate with normal responses.  Good tone, active with + flexion.   Continue to provide developmental support with therapeutic positioning for late  infant.    6) Social: Mom and Dad involved. Mom pumping EBM. Mom updated by Dr. Galvez. Mom understands the plan of care and had no questions today.  Continue to support and involve in care.     Healthcare Maintenance    State Screen:   Screen Drawn: 20 (20 0920), norrmal  CHD Screen:   · Screening complete: Done (20 2030)  · Pre-ductal %: 100 %  · Post-ductal %: 100 %  · Post-ductal limb test: Left foot  · CHD: Normal   Hearing Screen:   Hearing Test Results: Pass R;Pass L (20 0027)  Carseat Test:  prior to discharge  Hepatitis B:  Not given yet, prior to discharge  Pediatrician:  TBD    Patient and Medical Management/Plan of Care discussed with Neonatology   Outcome Facilitation Team (OFT) with MD, NNP & RN completed.    + DIAPHORESIS; no fever and no chills.

## 2020-06-02 ENCOUNTER — APPOINTMENT (OUTPATIENT)
Dept: POPULATION HEALTH | Facility: CLINIC | Age: 60
End: 2020-06-02
Payer: COMMERCIAL

## 2020-06-02 VITALS
WEIGHT: 144 LBS | TEMPERATURE: 98.7 F | HEART RATE: 106 BPM | SYSTOLIC BLOOD PRESSURE: 140 MMHG | DIASTOLIC BLOOD PRESSURE: 100 MMHG | OXYGEN SATURATION: 98 % | HEIGHT: 62 IN | RESPIRATION RATE: 16 BRPM | BODY MASS INDEX: 26.5 KG/M2

## 2020-06-02 PROCEDURE — 99203 OFFICE O/P NEW LOW 30 MIN: CPT

## 2020-06-03 RX ORDER — FLUTICASONE PROPIONATE 50 UG/1
50 SPRAY, METERED NASAL
Refills: 0 | Status: ACTIVE | COMMUNITY

## 2020-06-03 RX ORDER — BUDESONIDE AND FORMOTEROL FUMARATE DIHYDRATE 160; 4.5 UG/1; UG/1
160-4.5 AEROSOL RESPIRATORY (INHALATION)
Refills: 0 | Status: ACTIVE | COMMUNITY

## 2020-06-03 RX ORDER — LOSARTAN POTASSIUM 100 MG/1
100 TABLET, FILM COATED ORAL
Refills: 0 | Status: ACTIVE | COMMUNITY

## 2020-06-03 RX ORDER — ALBUTEROL 90 MCG
90 AEROSOL (GRAM) INHALATION
Refills: 0 | Status: ACTIVE | COMMUNITY

## 2020-11-10 NOTE — ASSESSMENT
[FreeTextEntry1] : 60 year old woman with a history of asthma that began while at work in her previous job.\par \par Given that symptoms exacerbated while at work, and improved after work, it is reasonable to conclude that the work environment precipitated her asthma.  \par \par Symptoms have now resolved with the elimination of exposure, although she notes occasional periods of exacerbation.\par \par % impairment: 0\par

## 2020-11-10 NOTE — HISTORY OF PRESENT ILLNESS
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  On 3/29/2019 she went to work as usual, and around noon she developed cough productive of clear mucus, wheezing, chest tightness, lightheadedness, and shortness of breath.  She left the building for her lunch break and went outside to her car, when her symptoms began to improve.  After a half hour her break was over and she returned to work.  She continued working through the afternoon, even though the symptoms returned.  When she left work for the day she began to feel better, and she felt ok over the weekend.  The next week she went back to work and her symptoms returned, but she continued working.  Eventually she went to urgent care (she thinks it was over the weekend but does not recall exactly when), where she was given a Rx for prednisone.\par She went to the emergency room later in April and from there was referred to ENT and allergist.  the allergist did skin testing that was negative twice.  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  At tat time her asthma symptoms resolved, but they occasionally return if she gets a URI, and occasional wheezing on exertion.  \par \par PMHx:\par denies past history of RAD or asthma prior to 3/2019\par denies seasonal allergies\par \par Social: \par never smoker\par \par FHx:\par father - emphysema and CAD\par mother - emphysema\par brother: dm\par son: seasonal allergies\par \par Occupational Hx:\par Current -  at [employer]\par [id number] - [employer], office work\par all prior jobs have been office work\par \par Home:\par lives in a house since 11/2019, prior to that she was in an apartment, no carpet, no pets\par \par Hobbies:\par exercise, jumping rope, run\par playing with great nephew\par watching tv, listening to music\par \par Currently she denies symptoms, feels generally well. not currently taking any of her asthma medications.\par \par

## 2020-11-10 NOTE — PHYSICAL EXAM
[General Appearance - Alert] : alert [General Appearance - In No Acute Distress] : in no acute distress [Sclera] : the sclera and conjunctiva were normal [Extraocular Movements] : extraocular movements were intact [PERRL With Normal Accommodation] : pupils were equal in size, round, and reactive to light [Outer Ear] : the ears and nose were normal in appearance [Oropharynx] : the oropharynx was normal [] : no respiratory distress [Respiration, Rhythm And Depth] : normal respiratory rhythm and effort [Auscultation Breath Sounds / Voice Sounds] : lungs were clear to auscultation bilaterally [Exaggerated Use Of Accessory Muscles For Inspiration] : no accessory muscle use [FreeTextEntry1] : mild tenderness over left maxillary sinus

## 2021-05-18 ENCOUNTER — APPOINTMENT (OUTPATIENT)
Dept: OBGYN | Facility: CLINIC | Age: 61
End: 2021-05-18
Payer: COMMERCIAL

## 2021-05-18 VITALS — DIASTOLIC BLOOD PRESSURE: 98 MMHG | BODY MASS INDEX: 26.89 KG/M2 | SYSTOLIC BLOOD PRESSURE: 156 MMHG | WEIGHT: 147 LBS

## 2021-05-18 PROCEDURE — 99072 ADDL SUPL MATRL&STAF TM PHE: CPT

## 2021-05-18 PROCEDURE — 99396 PREV VISIT EST AGE 40-64: CPT

## 2021-05-19 LAB
C TRACH RRNA SPEC QL NAA+PROBE: NOT DETECTED
HPV HIGH+LOW RISK DNA PNL CVX: NOT DETECTED
N GONORRHOEA RRNA SPEC QL NAA+PROBE: NOT DETECTED
SOURCE TP AMPLIFICATION: NORMAL

## 2021-05-22 LAB — CYTOLOGY CVX/VAG DOC THIN PREP: NORMAL

## 2021-05-28 ENCOUNTER — RESULT REVIEW (OUTPATIENT)
Age: 61
End: 2021-05-28

## 2021-05-28 ENCOUNTER — OUTPATIENT (OUTPATIENT)
Dept: OUTPATIENT SERVICES | Facility: HOSPITAL | Age: 61
LOS: 1 days | End: 2021-05-28
Payer: COMMERCIAL

## 2021-05-28 ENCOUNTER — APPOINTMENT (OUTPATIENT)
Dept: ULTRASOUND IMAGING | Facility: CLINIC | Age: 61
End: 2021-05-28
Payer: COMMERCIAL

## 2021-05-28 ENCOUNTER — APPOINTMENT (OUTPATIENT)
Dept: MAMMOGRAPHY | Facility: CLINIC | Age: 61
End: 2021-05-28
Payer: COMMERCIAL

## 2021-05-28 DIAGNOSIS — Z90.49 ACQUIRED ABSENCE OF OTHER SPECIFIED PARTS OF DIGESTIVE TRACT: Chronic | ICD-10-CM

## 2021-05-28 DIAGNOSIS — Z90.711 ACQUIRED ABSENCE OF UTERUS WITH REMAINING CERVICAL STUMP: Chronic | ICD-10-CM

## 2021-05-28 DIAGNOSIS — Z12.39 ENCOUNTER FOR OTHER SCREENING FOR MALIGNANT NEOPLASM OF BREAST: ICD-10-CM

## 2021-05-28 DIAGNOSIS — Z98.51 TUBAL LIGATION STATUS: Chronic | ICD-10-CM

## 2021-05-28 DIAGNOSIS — Z98.890 OTHER SPECIFIED POSTPROCEDURAL STATES: Chronic | ICD-10-CM

## 2021-05-28 DIAGNOSIS — Z98.891 HISTORY OF UTERINE SCAR FROM PREVIOUS SURGERY: Chronic | ICD-10-CM

## 2021-05-28 DIAGNOSIS — R10.2 PELVIC AND PERINEAL PAIN: ICD-10-CM

## 2021-05-28 PROCEDURE — 76856 US EXAM PELVIC COMPLETE: CPT | Mod: 26

## 2021-05-28 PROCEDURE — 76830 TRANSVAGINAL US NON-OB: CPT | Mod: 26

## 2021-05-28 PROCEDURE — 77063 BREAST TOMOSYNTHESIS BI: CPT | Mod: 26

## 2021-05-28 PROCEDURE — 77067 SCR MAMMO BI INCL CAD: CPT

## 2021-05-28 PROCEDURE — 77067 SCR MAMMO BI INCL CAD: CPT | Mod: 26

## 2021-05-28 PROCEDURE — 77063 BREAST TOMOSYNTHESIS BI: CPT

## 2021-05-28 PROCEDURE — 76856 US EXAM PELVIC COMPLETE: CPT

## 2021-05-28 PROCEDURE — 76830 TRANSVAGINAL US NON-OB: CPT

## 2021-06-29 ENCOUNTER — APPOINTMENT (OUTPATIENT)
Dept: OBGYN | Facility: CLINIC | Age: 61
End: 2021-06-29
Payer: COMMERCIAL

## 2021-06-29 VITALS
BODY MASS INDEX: 27.05 KG/M2 | HEIGHT: 62 IN | SYSTOLIC BLOOD PRESSURE: 148 MMHG | WEIGHT: 147 LBS | DIASTOLIC BLOOD PRESSURE: 91 MMHG

## 2021-06-29 DIAGNOSIS — R10.2 PELVIC AND PERINEAL PAIN: ICD-10-CM

## 2021-06-29 PROCEDURE — 99213 OFFICE O/P EST LOW 20 MIN: CPT

## 2021-06-29 PROCEDURE — 99072 ADDL SUPL MATRL&STAF TM PHE: CPT

## 2021-07-20 ENCOUNTER — TRANSCRIPTION ENCOUNTER (OUTPATIENT)
Age: 61
End: 2021-07-20

## 2021-07-20 ENCOUNTER — APPOINTMENT (OUTPATIENT)
Dept: POPULATION HEALTH | Facility: CLINIC | Age: 61
End: 2021-07-20
Payer: COMMERCIAL

## 2021-07-20 VITALS
RESPIRATION RATE: 15 BRPM | BODY MASS INDEX: 26.87 KG/M2 | TEMPERATURE: 96.6 F | HEART RATE: 87 BPM | OXYGEN SATURATION: 99 % | DIASTOLIC BLOOD PRESSURE: 82 MMHG | WEIGHT: 146 LBS | SYSTOLIC BLOOD PRESSURE: 125 MMHG | HEIGHT: 62 IN

## 2021-07-20 DIAGNOSIS — J45.909 UNSPECIFIED ASTHMA, UNCOMPLICATED: ICD-10-CM

## 2021-07-20 DIAGNOSIS — Z57.9 UNSPECIFIED ASTHMA, UNCOMPLICATED: ICD-10-CM

## 2021-07-20 PROCEDURE — 99072 ADDL SUPL MATRL&STAF TM PHE: CPT

## 2021-07-20 PROCEDURE — 99214 OFFICE O/P EST MOD 30 MIN: CPT

## 2021-07-20 SDOH — HEALTH STABILITY - PHYSICAL HEALTH: OCCUPATIONAL EXPOSURE TO UNSPECIFIED RISK FACTOR: Z57.9

## 2021-07-20 NOTE — ASSESSMENT
[FreeTextEntry1] : a. patient with history of asthma, prior on steroids. currently experiencing an exacerbation most probably related to an intercurrent infection. patient requests special accommodations as to continue working form home during the covid epidemic in order to avoid infection. 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 will complete request for accommodations for work requesting continuation of working form home during the covid restrictions due to high risk due to underlying respiratory disease. i recommended patient to conitnue under care with a pulmonary md as her asthma is not stable according to her description and despite use of meds.

## 2021-07-20 NOTE — PHYSICAL EXAM
[General Appearance - Alert] : alert [General Appearance - In No Acute Distress] : in no acute distress [General Appearance - Well Nourished] : well nourished [General Appearance - Well Developed] : well developed [Sclera] : the sclera and conjunctiva were normal [PERRL With Normal Accommodation] : pupils were equal in size, round, and reactive to light [Extraocular Movements] : extraocular movements were intact [Outer Ear] : the ears and nose were normal in appearance [Neck Appearance] : the appearance of the neck was normal [Neck Cervical Mass (___cm)] : no neck mass was observed [Jugular Venous Distention Increased] : there was no jugular-venous distention [Thyroid Diffuse Enlargement] : the thyroid was not enlarged [] : no respiratory distress [Respiration, Rhythm And Depth] : normal respiratory rhythm and effort [Exaggerated Use Of Accessory Muscles For Inspiration] : no accessory muscle use [Auscultation Breath Sounds / Voice Sounds] : lungs were clear to auscultation bilaterally [Lungs Percussion] : the lungs were normal to percussion [FreeTextEntry1] : no wheezes noted but some rhonchi at the base of the right lung. no prolonged expiratory time.  [Apical Impulse] : the apical impulse was normal [Heart Rate And Rhythm] : heart rate was normal and rhythm regular [Heart Sounds] : normal S1 and S2 [Heart Sounds Gallop] : no gallops [Murmurs] : no murmurs [Edema] : there was no peripheral edema [Cervical Lymph Nodes Enlarged Posterior Bilaterally] : posterior cervical [Cervical Lymph Nodes Enlarged Anterior Bilaterally] : anterior cervical [Supraclavicular Lymph Nodes Enlarged Bilaterally] : supraclavicular [Axillary Lymph Nodes Enlarged Bilaterally] : axillary

## 2021-07-20 NOTE — HISTORY OF PRESENT ILLNESS
[FreeTextEntry1] : s. previously seen by dr mauricio who has now left the practice.\par known patient with a history of asthma that she developed after being exposed to construction dusts at work. she subsequently changed jobs to a different place and her asthma significantly improved. while she previoulsy needed to use a nebulizer almost daily and was on systemic steroids, she was able to use a daily inhaler with symptoms tolerable. she continued with sob on efforts but was able to work at her new job. \par with the Confucian[employer] of the covid epidemic, patient was working form home.\par she was stable until the end of june when she traveled to [place]. upon returning she developed progressive shortness of breath and wheezing. she was reportedly seen by her pulmonary md who has recommended a course of amoxicillin, with which patient is currently undergoing. she is feeling better but still is noticing more severe sob as compared to prior to the infection. sob presents upon physical effort like adls at home, she describes difficulty breathing walking on inclines and difficulty breathing at night when lying flat on the bed. \par she also has additional neck and back conditions. she is usign symbicort and albuterol. \par patient is here as she would like to continue precautions of working form home during the covid epidemic due to her underlying asthma. 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 she does not know who of the students is vaccinated or not, and reportedly there is no mandate for the students to wear masks. due to her respiratory condition and despite being vaccinated, she feels she is at an elevated risk for covid infection. \par

## 2021-08-14 NOTE — ED STATDOCS - NS ED NOTE AC HIGH RISK COUNTRIES
EMERGENCY DEPARTMENT HISTORY AND PHYSICAL EXAM    Date: 8/13/2021  Patient Name: Maynor Sanchez    History of Presenting Illness     Chief Complaint   Patient presents with    Abdominal Pain         History Provided By: Patient    Chief Complaint: abdominal pain   Duration: onset  2 days ago   Timing:  Acute  Location: epigastric area  Quality: Sharp  Severity: 8 out of 10  Modifying Factors: none  Associated Symptoms: denies any other associated signs or symptoms      HPI: Maynor Sanchez is a 31 y.o. female with a PMH of No significant past medical history who presents with [place] pain acute onset 2 days ago. Patient states pain is sharp. Patient denies nausea vomiting diarrhea. Patient states she has a burning sensation in her stomach. Denies history of GERD    PCP: Molly, MD Ivonne    Current Outpatient Medications   Medication Sig Dispense Refill    famotidine (Pepcid) 20 mg tablet Take 1 Tablet by mouth two (2) times a day for 10 days. 20 Tablet 0    fluticasone propionate (FLONASE) 50 mcg/actuation nasal spray 2 Sprays by Both Nostrils route daily. 1 Bottle 0    benzocaine-menthol (CEPACOL SORE THROAT, ASHLYN-MEN,) 15-2.6 mg lozg lozenge Take 1 Lozenge by mouth every two (2) hours as needed for Sore throat. 16 Lozenge 0    ibuprofen (MOTRIN) 600 mg tablet Take 1 Tab by mouth every six (6) hours as needed for Pain. 20 Tab 0    etonogestrel (NEXPLANON SDRM) by SubDERmal route. Past History     Past Medical History:  History reviewed. No pertinent past medical history. Past Surgical History:  History reviewed. No pertinent surgical history. Family History:  History reviewed. No pertinent family history.     Social History:  Social History     Tobacco Use    Smoking status: Current Every Day Smoker    Smokeless tobacco: Never Used   Substance Use Topics    Alcohol use: No    Drug use: Yes     Types: Marijuana       Allergies:  No Known Allergies      Review of Systems   Review of Systems Constitutional: Negative for fatigue and fever. Respiratory: Negative for shortness of breath and wheezing. Cardiovascular: Negative for chest pain. Gastrointestinal: Positive for abdominal pain. Musculoskeletal: Negative for arthralgias, myalgias, neck pain and neck stiffness. Skin: Negative for pallor and rash. Neurological: Negative for dizziness, tremors and headaches. All other systems reviewed and are negative. Physical Exam     Vitals:    08/13/21 0853   BP: 107/64   Pulse: 75   Resp: 19   Temp: 98.8 °F (37.1 °C)   SpO2: 99%   Weight: 49.9 kg (110 lb)   Height: 5' 1\" (1.549 m)     Physical Exam  Vitals and nursing note reviewed. Constitutional:       General: She is not in acute distress. Appearance: She is well-developed. HENT:      Head: Normocephalic and atraumatic. Right Ear: External ear normal.      Left Ear: External ear normal.      Nose: Nose normal.   Eyes:      Conjunctiva/sclera: Conjunctivae normal.   Cardiovascular:      Rate and Rhythm: Normal rate and regular rhythm. Heart sounds: Normal heart sounds. Pulmonary:      Effort: Pulmonary effort is normal. No respiratory distress. Breath sounds: Normal breath sounds. No wheezing. Abdominal:      General: Bowel sounds are normal.      Palpations: Abdomen is soft. Tenderness: There is abdominal tenderness in the epigastric area. Musculoskeletal:         General: Normal range of motion. Cervical back: Normal range of motion and neck supple. Lymphadenopathy:      Cervical: No cervical adenopathy. Skin:     General: Skin is warm and dry. Findings: No rash. Neurological:      Mental Status: She is alert and oriented to person, place, and time. Cranial Nerves: No cranial nerve deficit. Coordination: Coordination normal.   Psychiatric:         Behavior: Behavior normal.         Thought Content:  Thought content normal.         Judgment: Judgment normal.           Diagnostic Study Results     Labs -     Recent Results (from the past 12 hour(s))   URINALYSIS W/ REFLEX CULTURE    Collection Time: 08/13/21 10:08 AM    Specimen: Urine   Result Value Ref Range    Color YELLOW/STRAW      Appearance CLEAR CLEAR      Specific gravity 1.015 1.003 - 1.030      pH (UA) 7.0 5.0 - 8.0      Protein Negative NEG mg/dL    Glucose Negative NEG mg/dL    Ketone Negative NEG mg/dL    Bilirubin Negative NEG      Blood Negative NEG      Urobilinogen 1.0 0.2 - 1.0 EU/dL    Nitrites Negative NEG      Leukocyte Esterase Negative NEG      WBC 5-10 0 - 4 /hpf    RBC 0-5 0 - 5 /hpf    Epithelial cells FEW FEW /lpf    Bacteria Negative NEG /hpf    UA:UC IF INDICATED CULTURE NOT INDICATED BY UA RESULT CNI     HCG URINE, QL. - POC    Collection Time: 08/13/21 10:09 AM   Result Value Ref Range    Pregnancy test,urine (POC) Negative NEG         Radiologic Studies -   No orders to display     CT Results  (Last 48 hours)    None        CXR Results  (Last 48 hours)    None            Medical Decision Making   I am the first provider for this patient. I reviewed the vital signs, available nursing notes, past medical history, past surgical history, family history and social history. Vital Signs-Reviewed the patient's vital signs. Records Reviewed: Nursing Notes    Provider Notes (Medical Decision Making):   DDX GERD gastritis peptic ulcer disease dyspepsia          Disposition:  home    DISCHARGE NOTE:           Care plan outlined and precautions discussed. Patient has no new complaints, changes, or physical findings. All medications were reviewed with the patient; will d/c home with pepcid. All of pt's questions and concerns were addressed. Patient was instructed and agrees to follow up with PCP, as well as to return to the ED upon further deterioration. Patient is ready to go home.     Follow-up Information     Follow up With Specialties Details Why Contact Info    [facility]  In 1 week [address] [facility] [place], [address] [place] [id number] [phone]          Discharge Medication List as of 8/13/2021 11:19 AM      START taking these medications    Details   famotidine (Pepcid) 20 mg tablet Take 1 Tablet by mouth two (2) times a day for 10 days. , Normal, Disp-20 Tablet, R-0         CONTINUE these medications which have NOT CHANGED    Details   fluticasone propionate (FLONASE) 50 mcg/actuation nasal spray 2 Sprays by Both Nostrils route daily. , Normal, Disp-1 Bottle, R-0      benzocaine-menthol (CEPACOL SORE THROAT, ASHLYN-MEN,) 15-2.6 mg lozg lozenge Take 1 Lozenge by mouth every two (2) hours as needed for Sore throat., Normal, Disp-16 Lozenge, R-0      ibuprofen (MOTRIN) 600 mg tablet Take 1 Tab by mouth every six (6) hours as needed for Pain., Normal, Disp-20 Tab, R-0      etonogestrel (NEXPLANON SDRM) by SubDERmal route., Historical Med             Procedures:  Procedures    Please note that this dictation was completed with Dragon, computer voice recognition software. Quite often unanticipated grammatical, syntax, homophones, and other interpretive errors are inadvertently transcribed by the computer software. Please disregard these errors. Additionally, please excuse any errors that have escaped final proofreading. Diagnosis     Clinical Impression:   1.  Dyspepsia No
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
## 2022-05-09 DIAGNOSIS — R92.2 INCONCLUSIVE MAMMOGRAM: ICD-10-CM

## 2022-06-03 ENCOUNTER — APPOINTMENT (OUTPATIENT)
Dept: OBGYN | Facility: CLINIC | Age: 62
End: 2022-06-03

## 2022-06-04 ENCOUNTER — RESULT REVIEW (OUTPATIENT)
Age: 62
End: 2022-06-04

## 2022-06-04 ENCOUNTER — OUTPATIENT (OUTPATIENT)
Dept: OUTPATIENT SERVICES | Facility: HOSPITAL | Age: 62
LOS: 1 days | End: 2022-06-04
Payer: COMMERCIAL

## 2022-06-04 ENCOUNTER — APPOINTMENT (OUTPATIENT)
Dept: MAMMOGRAPHY | Facility: CLINIC | Age: 62
End: 2022-06-04
Payer: COMMERCIAL

## 2022-06-04 DIAGNOSIS — R92.8 OTHER ABNORMAL AND INCONCLUSIVE FINDINGS ON DIAGNOSTIC IMAGING OF BREAST: ICD-10-CM

## 2022-06-04 DIAGNOSIS — Z98.890 OTHER SPECIFIED POSTPROCEDURAL STATES: Chronic | ICD-10-CM

## 2022-06-04 DIAGNOSIS — Z90.49 ACQUIRED ABSENCE OF OTHER SPECIFIED PARTS OF DIGESTIVE TRACT: Chronic | ICD-10-CM

## 2022-06-04 DIAGNOSIS — Z90.711 ACQUIRED ABSENCE OF UTERUS WITH REMAINING CERVICAL STUMP: Chronic | ICD-10-CM

## 2022-06-04 DIAGNOSIS — Z98.51 TUBAL LIGATION STATUS: Chronic | ICD-10-CM

## 2022-06-04 DIAGNOSIS — Z00.8 ENCOUNTER FOR OTHER GENERAL EXAMINATION: ICD-10-CM

## 2022-06-04 DIAGNOSIS — Z98.891 HISTORY OF UTERINE SCAR FROM PREVIOUS SURGERY: Chronic | ICD-10-CM

## 2022-06-04 PROCEDURE — 77063 BREAST TOMOSYNTHESIS BI: CPT | Mod: 26

## 2022-06-04 PROCEDURE — 77067 SCR MAMMO BI INCL CAD: CPT | Mod: 26

## 2022-06-04 PROCEDURE — 77067 SCR MAMMO BI INCL CAD: CPT

## 2022-06-04 PROCEDURE — 76641 ULTRASOUND BREAST COMPLETE: CPT

## 2022-06-04 PROCEDURE — 77063 BREAST TOMOSYNTHESIS BI: CPT

## 2022-06-04 PROCEDURE — 76641 ULTRASOUND BREAST COMPLETE: CPT | Mod: 26,50

## 2022-06-28 ENCOUNTER — APPOINTMENT (OUTPATIENT)
Dept: OBGYN | Facility: CLINIC | Age: 62
End: 2022-06-28
Payer: COMMERCIAL

## 2022-06-28 VITALS
DIASTOLIC BLOOD PRESSURE: 88 MMHG | WEIGHT: 144 LBS | HEIGHT: 62 IN | SYSTOLIC BLOOD PRESSURE: 130 MMHG | BODY MASS INDEX: 26.5 KG/M2

## 2022-06-28 PROCEDURE — 99396 PREV VISIT EST AGE 40-64: CPT

## 2022-06-28 NOTE — HISTORY OF PRESENT ILLNESS
[Y] : Positive pregnancy history [Menarche Age: ____] : age at menarche was [unfilled] [PGHxTotal] : 2 [[facility] : [facility] [PGHxPremature] : 0 [PGHxAbortions] : 0 [[facility]] : 2 [PGHxABInduced] : 0 [PGHxABSpont] : 0 [PGHxEctopic] : 0 [PGHxMultBirths] : 0

## 2022-06-28 NOTE — PHYSICAL EXAM
[Chaperone Present] : A chaperone was present in the examining room during all aspects of the physical examination [Appropriately responsive] : appropriately responsive [Alert] : alert [No Acute Distress] : no acute distress [No Lymphadenopathy] : no lymphadenopathy [Regular Rate Rhythm] : regular rate rhythm [No Murmurs] : no murmurs [Clear to Auscultation B/L] : clear to auscultation bilaterally [Soft] : soft [Non-tender] : non-tender [Non-distended] : non-distended [No HSM] : No HSM [No Lesions] : no lesions [No Mass] : no mass [Oriented x3] : oriented x3 [Examination Of The Breasts] : a normal appearance [No Masses] : no breast masses were palpable [Vulvar Atrophy] : vulvar atrophy [Labia Majora] : normal [Labia Minora] : normal [Normal] : normal [Atrophy] : atrophy [Dry Mucosa] : dry mucosa [Absent] : absent [Uterine Adnexae] : normal

## 2022-07-02 LAB — CYTOLOGY CVX/VAG DOC THIN PREP: ABNORMAL

## 2022-10-31 NOTE — ED STATDOCS - NS ED MD DISPO DISCHARGE
Home Infliximab Counseling:  I discussed with the patient the risks of infliximab including but not limited to myelosuppression, immunosuppression, autoimmune hepatitis, demyelinating diseases, lymphoma, and serious infections.  The patient understands that monitoring is required including a PPD at baseline and must alert us or the primary physician if symptoms of infection or other concerning signs are noted.

## 2022-12-13 NOTE — ED ADULT TRIAGE NOTE - BP NONINVASIVE DIASTOLIC (MM HG)
115 Clofazimine Counseling:  I discussed with the patient the risks of clofazimine including but not limited to skin and eye pigmentation, liver damage, nausea/vomiting, gastrointestinal bleeding and allergy.

## 2023-09-18 ENCOUNTER — APPOINTMENT (OUTPATIENT)
Dept: OBGYN | Facility: CLINIC | Age: 63
End: 2023-09-18
Payer: COMMERCIAL

## 2023-09-18 VITALS
HEIGHT: 62 IN | DIASTOLIC BLOOD PRESSURE: 84 MMHG | WEIGHT: 146.4 LBS | SYSTOLIC BLOOD PRESSURE: 148 MMHG | BODY MASS INDEX: 26.94 KG/M2

## 2023-09-18 DIAGNOSIS — Z78.0 ASYMPTOMATIC MENOPAUSAL STATE: ICD-10-CM

## 2023-09-18 DIAGNOSIS — N90.5 ATROPHY OF VULVA: ICD-10-CM

## 2023-09-18 DIAGNOSIS — N95.2 POSTMENOPAUSAL ATROPHIC VAGINITIS: ICD-10-CM

## 2023-09-18 DIAGNOSIS — R23.2 FLUSHING: ICD-10-CM

## 2023-09-18 PROCEDURE — 99213 OFFICE O/P EST LOW 20 MIN: CPT

## 2023-09-19 LAB — HPV HIGH+LOW RISK DNA PNL CVX: NOT DETECTED

## 2023-09-21 LAB — CYTOLOGY CVX/VAG DOC THIN PREP: ABNORMAL

## 2023-09-26 ENCOUNTER — OUTPATIENT (OUTPATIENT)
Dept: OUTPATIENT SERVICES | Facility: HOSPITAL | Age: 63
LOS: 1 days | End: 2023-09-26
Payer: COMMERCIAL

## 2023-09-26 ENCOUNTER — RESULT REVIEW (OUTPATIENT)
Age: 63
End: 2023-09-26

## 2023-09-26 ENCOUNTER — APPOINTMENT (OUTPATIENT)
Dept: MAMMOGRAPHY | Facility: CLINIC | Age: 63
End: 2023-09-26
Payer: COMMERCIAL

## 2023-09-26 ENCOUNTER — APPOINTMENT (OUTPATIENT)
Dept: ULTRASOUND IMAGING | Facility: CLINIC | Age: 63
End: 2023-09-26
Payer: COMMERCIAL

## 2023-09-26 DIAGNOSIS — Z98.891 HISTORY OF UTERINE SCAR FROM PREVIOUS SURGERY: Chronic | ICD-10-CM

## 2023-09-26 DIAGNOSIS — Z00.8 ENCOUNTER FOR OTHER GENERAL EXAMINATION: ICD-10-CM

## 2023-09-26 DIAGNOSIS — Z90.49 ACQUIRED ABSENCE OF OTHER SPECIFIED PARTS OF DIGESTIVE TRACT: Chronic | ICD-10-CM

## 2023-09-26 DIAGNOSIS — Z90.711 ACQUIRED ABSENCE OF UTERUS WITH REMAINING CERVICAL STUMP: Chronic | ICD-10-CM

## 2023-09-26 DIAGNOSIS — Z98.890 OTHER SPECIFIED POSTPROCEDURAL STATES: Chronic | ICD-10-CM

## 2023-09-26 DIAGNOSIS — Z98.51 TUBAL LIGATION STATUS: Chronic | ICD-10-CM

## 2023-09-26 PROCEDURE — 77067 SCR MAMMO BI INCL CAD: CPT

## 2023-09-26 PROCEDURE — 76641 ULTRASOUND BREAST COMPLETE: CPT

## 2023-09-26 PROCEDURE — 77067 SCR MAMMO BI INCL CAD: CPT | Mod: 26

## 2023-09-26 PROCEDURE — 76641 ULTRASOUND BREAST COMPLETE: CPT | Mod: 26,50

## 2023-09-26 PROCEDURE — 77063 BREAST TOMOSYNTHESIS BI: CPT | Mod: 26

## 2023-09-26 PROCEDURE — 77063 BREAST TOMOSYNTHESIS BI: CPT

## 2023-12-15 ENCOUNTER — OFFICE (OUTPATIENT)
Dept: URBAN - METROPOLITAN AREA CLINIC 104 | Facility: CLINIC | Age: 63
Setting detail: OPHTHALMOLOGY
End: 2023-12-15
Payer: COMMERCIAL

## 2023-12-15 DIAGNOSIS — H16.223: ICD-10-CM

## 2023-12-15 DIAGNOSIS — H25.13: ICD-10-CM

## 2023-12-15 DIAGNOSIS — H40.013: ICD-10-CM

## 2023-12-15 PROCEDURE — 92004 COMPRE OPH EXAM NEW PT 1/>: CPT | Performed by: OPTOMETRIST

## 2023-12-15 PROCEDURE — 76514 ECHO EXAM OF EYE THICKNESS: CPT | Performed by: OPTOMETRIST

## 2023-12-15 PROCEDURE — 92133 CPTRZD OPH DX IMG PST SGM ON: CPT | Performed by: OPTOMETRIST

## 2023-12-15 ASSESSMENT — SUPERFICIAL PUNCTATE KERATITIS (SPK)
OD_SPK: 2+
OS_SPK: 2+

## 2023-12-15 ASSESSMENT — REFRACTION_AUTOREFRACTION
OS_CYLINDER: -1.00
OS_AXIS: 109
OD_SPHERE: +1.00
OD_CYLINDER: -0.25
OD_AXIS: 92
OS_SPHERE: +1.00

## 2023-12-15 ASSESSMENT — SPHEQUIV_DERIVED
OD_SPHEQUIV: 0.875
OS_SPHEQUIV: 0.5

## 2023-12-15 ASSESSMENT — CONFRONTATIONAL VISUAL FIELD TEST (CVF)
OD_FINDINGS: FULL
OS_FINDINGS: FULL

## 2024-01-19 ENCOUNTER — OFFICE (OUTPATIENT)
Dept: URBAN - METROPOLITAN AREA CLINIC 104 | Facility: CLINIC | Age: 64
Setting detail: OPHTHALMOLOGY
End: 2024-01-19
Payer: COMMERCIAL

## 2024-01-19 ENCOUNTER — RX ONLY (RX ONLY)
Age: 64
End: 2024-01-19

## 2024-01-19 DIAGNOSIS — H40.013: ICD-10-CM

## 2024-01-19 DIAGNOSIS — H25.13: ICD-10-CM

## 2024-01-19 DIAGNOSIS — H16.223: ICD-10-CM

## 2024-01-19 PROCEDURE — 92012 INTRM OPH EXAM EST PATIENT: CPT | Mod: 25 | Performed by: OPTOMETRIST

## 2024-01-19 PROCEDURE — 68761 CLOSE TEAR DUCT OPENING: CPT | Mod: 50 | Performed by: OPTOMETRIST

## 2024-01-19 ASSESSMENT — REFRACTION_AUTOREFRACTION
OD_SPHERE: +1.50
OS_CYLINDER: -0.50
OS_AXIS: 067
OD_CYLINDER: -0.50
OS_SPHERE: +1.75
OD_AXIS: 101

## 2024-01-19 ASSESSMENT — SPHEQUIV_DERIVED
OD_SPHEQUIV: 1.25
OS_SPHEQUIV: 1.5

## 2024-01-19 ASSESSMENT — PUNCTA - ASSESSMENT
OD_PUNCTA: COL PLUG
OS_PUNCTA: COL PLUG

## 2024-01-19 ASSESSMENT — TEAR BREAK UP TIME (TBUT)
OD_TBUT: 1+
OS_TBUT: 1+

## 2024-01-19 ASSESSMENT — LACRIMAL DUCT - ASSESSMENT
OS_LACRIMAL_DUCT: 0.3MM OASIS
OD_LACRIMAL_DUCT: 0.3MM OASIS

## 2024-01-19 ASSESSMENT — SUPERFICIAL PUNCTATE KERATITIS (SPK)
OS_SPK: 1+
OD_SPK: 1+

## 2024-01-19 ASSESSMENT — CONFRONTATIONAL VISUAL FIELD TEST (CVF)
OD_FINDINGS: FULL
OS_FINDINGS: FULL

## 2024-04-19 ENCOUNTER — RX ONLY (RX ONLY)
Age: 64
End: 2024-04-19

## 2024-04-19 ENCOUNTER — OFFICE (OUTPATIENT)
Dept: URBAN - METROPOLITAN AREA CLINIC 104 | Facility: CLINIC | Age: 64
Setting detail: OPHTHALMOLOGY
End: 2024-04-19
Payer: COMMERCIAL

## 2024-04-19 DIAGNOSIS — H25.13: ICD-10-CM

## 2024-04-19 DIAGNOSIS — H40.013: ICD-10-CM

## 2024-04-19 DIAGNOSIS — H16.223: ICD-10-CM

## 2024-04-19 PROCEDURE — 99213 OFFICE O/P EST LOW 20 MIN: CPT | Mod: 25 | Performed by: OPTOMETRIST

## 2024-04-19 PROCEDURE — 68761 CLOSE TEAR DUCT OPENING: CPT | Mod: 50 | Performed by: OPTOMETRIST

## 2024-04-19 PROCEDURE — 92250 FUNDUS PHOTOGRAPHY W/I&R: CPT | Performed by: OPTOMETRIST

## 2024-07-12 ENCOUNTER — OFFICE (OUTPATIENT)
Dept: URBAN - METROPOLITAN AREA CLINIC 104 | Facility: CLINIC | Age: 64
Setting detail: OPHTHALMOLOGY
End: 2024-07-12
Payer: COMMERCIAL

## 2024-07-12 DIAGNOSIS — H40.013: ICD-10-CM

## 2024-07-12 DIAGNOSIS — H16.223: ICD-10-CM

## 2024-07-12 DIAGNOSIS — H25.13: ICD-10-CM

## 2024-07-12 PROCEDURE — 68761 CLOSE TEAR DUCT OPENING: CPT | Mod: 50 | Performed by: OPTOMETRIST

## 2024-07-12 PROCEDURE — 92012 INTRM OPH EXAM EST PATIENT: CPT | Mod: 25 | Performed by: OPTOMETRIST

## 2024-07-12 ASSESSMENT — CONFRONTATIONAL VISUAL FIELD TEST (CVF)
OS_FINDINGS: FULL
OD_FINDINGS: FULL

## 2024-08-23 NOTE — ED STATDOCS - DR. NAME
08/23/24   Brittany Chavez  YOB: 1989    To Whom It May Concern:    Brittany Chavze was seen in our clinic on 8/23/2024 for a full day of appointments . Please excuse Brittany for her absence from work on this day to make the appointment.    If you have any questions or concerns, please don't hesitate to call.           Sincerely,       Tonya Mccormack RN BSN [facility]  Living Donor Coordinator        
Lui

## 2024-10-29 ENCOUNTER — RESULT REVIEW (OUTPATIENT)
Age: 64
End: 2024-10-29

## 2024-10-29 ENCOUNTER — APPOINTMENT (OUTPATIENT)
Dept: MAMMOGRAPHY | Facility: CLINIC | Age: 64
End: 2024-10-29
Payer: COMMERCIAL

## 2024-10-29 ENCOUNTER — OUTPATIENT (OUTPATIENT)
Dept: OUTPATIENT SERVICES | Facility: HOSPITAL | Age: 64
LOS: 1 days | End: 2024-10-29
Payer: COMMERCIAL

## 2024-10-29 ENCOUNTER — APPOINTMENT (OUTPATIENT)
Dept: ULTRASOUND IMAGING | Facility: CLINIC | Age: 64
End: 2024-10-29
Payer: COMMERCIAL

## 2024-10-29 DIAGNOSIS — Z90.49 ACQUIRED ABSENCE OF OTHER SPECIFIED PARTS OF DIGESTIVE TRACT: Chronic | ICD-10-CM

## 2024-10-29 DIAGNOSIS — Z98.51 TUBAL LIGATION STATUS: Chronic | ICD-10-CM

## 2024-10-29 DIAGNOSIS — Z98.891 HISTORY OF UTERINE SCAR FROM PREVIOUS SURGERY: Chronic | ICD-10-CM

## 2024-10-29 DIAGNOSIS — Z98.890 OTHER SPECIFIED POSTPROCEDURAL STATES: Chronic | ICD-10-CM

## 2024-10-29 DIAGNOSIS — Z12.39 ENCOUNTER FOR OTHER SCREENING FOR MALIGNANT NEOPLASM OF BREAST: ICD-10-CM

## 2024-10-29 DIAGNOSIS — Z90.711 ACQUIRED ABSENCE OF UTERUS WITH REMAINING CERVICAL STUMP: Chronic | ICD-10-CM

## 2024-10-29 PROCEDURE — 76641 ULTRASOUND BREAST COMPLETE: CPT | Mod: 26,50

## 2024-10-29 PROCEDURE — 77063 BREAST TOMOSYNTHESIS BI: CPT | Mod: 26

## 2024-10-29 PROCEDURE — 77067 SCR MAMMO BI INCL CAD: CPT | Mod: 26

## 2024-10-29 PROCEDURE — 77067 SCR MAMMO BI INCL CAD: CPT

## 2024-10-29 PROCEDURE — 76641 ULTRASOUND BREAST COMPLETE: CPT

## 2024-10-29 PROCEDURE — 77063 BREAST TOMOSYNTHESIS BI: CPT

## 2024-11-06 ENCOUNTER — APPOINTMENT (OUTPATIENT)
Dept: OBGYN | Facility: CLINIC | Age: 64
End: 2024-11-06
Payer: COMMERCIAL

## 2024-11-06 VITALS
DIASTOLIC BLOOD PRESSURE: 82 MMHG | BODY MASS INDEX: 25.58 KG/M2 | WEIGHT: 139 LBS | SYSTOLIC BLOOD PRESSURE: 118 MMHG | HEIGHT: 62 IN

## 2024-11-06 DIAGNOSIS — Z01.411 ENCOUNTER FOR GYNECOLOGICAL EXAMINATION (GENERAL) (ROUTINE) WITH ABNORMAL FINDINGS: ICD-10-CM

## 2024-11-06 DIAGNOSIS — N95.2 POSTMENOPAUSAL ATROPHIC VAGINITIS: ICD-10-CM

## 2024-11-06 DIAGNOSIS — Z00.00 ENCOUNTER FOR GENERAL ADULT MEDICAL EXAMINATION W/OUT ABNORMAL FINDINGS: ICD-10-CM

## 2024-11-06 DIAGNOSIS — N90.5 ATROPHY OF VULVA: ICD-10-CM

## 2024-11-06 DIAGNOSIS — Z80.3 FAMILY HISTORY OF MALIGNANT NEOPLASM OF BREAST: ICD-10-CM

## 2024-11-06 PROCEDURE — 99459 PELVIC EXAMINATION: CPT

## 2024-11-06 PROCEDURE — 99396 PREV VISIT EST AGE 40-64: CPT

## 2024-11-06 PROCEDURE — 99214 OFFICE O/P EST MOD 30 MIN: CPT | Mod: 25

## 2024-11-11 LAB — CYTOLOGY CVX/VAG DOC THIN PREP: ABNORMAL

## 2024-11-21 ENCOUNTER — OFFICE (OUTPATIENT)
Dept: URBAN - METROPOLITAN AREA CLINIC 104 | Facility: CLINIC | Age: 64
Setting detail: OPHTHALMOLOGY
End: 2024-11-21
Payer: COMMERCIAL

## 2024-11-21 DIAGNOSIS — H16.223: ICD-10-CM

## 2024-11-21 DIAGNOSIS — H40.013: ICD-10-CM

## 2024-11-21 DIAGNOSIS — H25.13: ICD-10-CM

## 2024-11-21 PROBLEM — E11.9 DIABETES TYPE 2 NO RETINOPATHY: Status: ACTIVE | Noted: 2024-11-21

## 2024-11-21 PROCEDURE — 92014 COMPRE OPH EXAM EST PT 1/>: CPT | Performed by: OPTOMETRIST

## 2024-11-21 PROCEDURE — 92133 CPTRZD OPH DX IMG PST SGM ON: CPT | Performed by: OPTOMETRIST

## 2024-11-21 ASSESSMENT — TONOMETRY
OS_IOP_MMHG: 18
OD_IOP_MMHG: 18

## 2024-11-21 ASSESSMENT — PACHYMETRY
OD_CT_CORRECTION: 1
OS_CT_UM: 513
OS_CT_CORRECTION: 2
OD_CT_UM: 537

## 2024-11-21 ASSESSMENT — REFRACTION_AUTOREFRACTION
OS_CYLINDER: -0.25
OS_SPHERE: +1.50
OD_SPHERE: +1.25
OD_CYLINDER: -0.50
OD_AXIS: 180
OS_AXIS: 096

## 2024-11-21 ASSESSMENT — PUNCTA - ASSESSMENT
OS_PUNCTA: 3MON PLUG COL PLUG
OD_PUNCTA: 3MON PLUG COL PLUG

## 2024-11-21 ASSESSMENT — KERATOMETRY
OD_K2POWER_DIOPTERS: 43.49
OD_K1POWER_DIOPTERS: 42.40
OS_K2POWER_DIOPTERS: 43.60
OD_AXISANGLE_DEGREES: 065
OS_K1POWER_DIOPTERS: 42.88
OS_AXISANGLE_DEGREES: 118

## 2024-11-21 ASSESSMENT — VISUAL ACUITY
OD_BCVA: 20/25-1
OS_BCVA: 20/30-2

## 2024-11-21 ASSESSMENT — TEAR BREAK UP TIME (TBUT)
OS_TBUT: ABSENT
OD_TBUT: ABSENT

## 2024-11-21 ASSESSMENT — SUPERFICIAL PUNCTATE KERATITIS (SPK)
OD_SPK: ABSENT
OS_SPK: ABSENT

## 2024-11-21 ASSESSMENT — CONFRONTATIONAL VISUAL FIELD TEST (CVF)
OD_FINDINGS: FULL
OS_FINDINGS: FULL

## 2024-11-21 ASSESSMENT — LACRIMAL DUCT - ASSESSMENT
OS_LACRIMAL_DUCT: FCI PROLONG 0.3MM
OD_LACRIMAL_DUCT: FCI PROLONG 0.3MM